# Patient Record
Sex: MALE | Race: BLACK OR AFRICAN AMERICAN | NOT HISPANIC OR LATINO | Employment: UNEMPLOYED | URBAN - METROPOLITAN AREA
[De-identification: names, ages, dates, MRNs, and addresses within clinical notes are randomized per-mention and may not be internally consistent; named-entity substitution may affect disease eponyms.]

---

## 2019-09-02 ENCOUNTER — APPOINTMENT (OUTPATIENT)
Dept: RADIOLOGY | Facility: MEDICAL CENTER | Age: 49
End: 2019-09-02
Attending: EMERGENCY MEDICINE
Payer: MEDICARE

## 2019-09-02 ENCOUNTER — HOSPITAL ENCOUNTER (EMERGENCY)
Facility: MEDICAL CENTER | Age: 49
End: 2019-09-03
Attending: EMERGENCY MEDICINE
Payer: MEDICARE

## 2019-09-02 DIAGNOSIS — I71.019 ACUTE THORACIC AORTIC DISSECTION (HCC): ICD-10-CM

## 2019-09-02 DIAGNOSIS — R10.31 RIGHT GROIN PAIN: ICD-10-CM

## 2019-09-02 DIAGNOSIS — I10 UNCONTROLLED HYPERTENSION: ICD-10-CM

## 2019-09-02 LAB
ALBUMIN SERPL BCP-MCNC: 4.6 G/DL (ref 3.2–4.9)
ALBUMIN/GLOB SERPL: 1.4 G/DL
ALP SERPL-CCNC: 88 U/L (ref 30–99)
ALT SERPL-CCNC: 20 U/L (ref 2–50)
ANION GAP SERPL CALC-SCNC: 13 MMOL/L (ref 0–11.9)
APTT PPP: 27.3 SEC (ref 24.7–36)
AST SERPL-CCNC: 27 U/L (ref 12–45)
BASOPHILS # BLD AUTO: 0.6 % (ref 0–1.8)
BASOPHILS # BLD: 0.04 K/UL (ref 0–0.12)
BILIRUB SERPL-MCNC: 0.5 MG/DL (ref 0.1–1.5)
BUN SERPL-MCNC: 17 MG/DL (ref 8–22)
CALCIUM SERPL-MCNC: 9.7 MG/DL (ref 8.5–10.5)
CHLORIDE SERPL-SCNC: 106 MMOL/L (ref 96–112)
CO2 SERPL-SCNC: 21 MMOL/L (ref 20–33)
CREAT SERPL-MCNC: 1.05 MG/DL (ref 0.5–1.4)
EKG IMPRESSION: NORMAL
EOSINOPHIL # BLD AUTO: 0.05 K/UL (ref 0–0.51)
EOSINOPHIL NFR BLD: 0.7 % (ref 0–6.9)
ERYTHROCYTE [DISTWIDTH] IN BLOOD BY AUTOMATED COUNT: 41.1 FL (ref 35.9–50)
GLOBULIN SER CALC-MCNC: 3.4 G/DL (ref 1.9–3.5)
GLUCOSE SERPL-MCNC: 103 MG/DL (ref 65–99)
HCT VFR BLD AUTO: 43.9 % (ref 42–52)
HGB BLD-MCNC: 14.9 G/DL (ref 14–18)
IMM GRANULOCYTES # BLD AUTO: 0.02 K/UL (ref 0–0.11)
IMM GRANULOCYTES NFR BLD AUTO: 0.3 % (ref 0–0.9)
INR PPP: 1.01 (ref 0.87–1.13)
LYMPHOCYTES # BLD AUTO: 1.86 K/UL (ref 1–4.8)
LYMPHOCYTES NFR BLD: 27.8 % (ref 22–41)
MCH RBC QN AUTO: 28.9 PG (ref 27–33)
MCHC RBC AUTO-ENTMCNC: 33.9 G/DL (ref 33.7–35.3)
MCV RBC AUTO: 85.1 FL (ref 81.4–97.8)
MONOCYTES # BLD AUTO: 0.5 K/UL (ref 0–0.85)
MONOCYTES NFR BLD AUTO: 7.5 % (ref 0–13.4)
NEUTROPHILS # BLD AUTO: 4.21 K/UL (ref 1.82–7.42)
NEUTROPHILS NFR BLD: 63.1 % (ref 44–72)
NRBC # BLD AUTO: 0 K/UL
NRBC BLD-RTO: 0 /100 WBC
NT-PROBNP SERPL IA-MCNC: 882 PG/ML (ref 0–125)
PLATELET # BLD AUTO: 237 K/UL (ref 164–446)
PMV BLD AUTO: 10.8 FL (ref 9–12.9)
POTASSIUM SERPL-SCNC: 3.4 MMOL/L (ref 3.6–5.5)
PROT SERPL-MCNC: 8 G/DL (ref 6–8.2)
PROTHROMBIN TIME: 13.5 SEC (ref 12–14.6)
RBC # BLD AUTO: 5.16 M/UL (ref 4.7–6.1)
SODIUM SERPL-SCNC: 140 MMOL/L (ref 135–145)
TROPONIN T SERPL-MCNC: 17 NG/L (ref 6–19)
WBC # BLD AUTO: 6.7 K/UL (ref 4.8–10.8)

## 2019-09-02 PROCEDURE — 83880 ASSAY OF NATRIURETIC PEPTIDE: CPT

## 2019-09-02 PROCEDURE — 700101 HCHG RX REV CODE 250: Performed by: EMERGENCY MEDICINE

## 2019-09-02 PROCEDURE — 84484 ASSAY OF TROPONIN QUANT: CPT

## 2019-09-02 PROCEDURE — 36620 INSERTION CATHETER ARTERY: CPT

## 2019-09-02 PROCEDURE — 85610 PROTHROMBIN TIME: CPT

## 2019-09-02 PROCEDURE — 700101 HCHG RX REV CODE 250

## 2019-09-02 PROCEDURE — 80053 COMPREHEN METABOLIC PANEL: CPT

## 2019-09-02 PROCEDURE — 71045 X-RAY EXAM CHEST 1 VIEW: CPT

## 2019-09-02 PROCEDURE — 85025 COMPLETE CBC W/AUTO DIFF WBC: CPT

## 2019-09-02 PROCEDURE — 85730 THROMBOPLASTIN TIME PARTIAL: CPT

## 2019-09-02 PROCEDURE — 74175 CTA ABDOMEN W/CONTRAST: CPT

## 2019-09-02 PROCEDURE — 93005 ELECTROCARDIOGRAM TRACING: CPT | Performed by: EMERGENCY MEDICINE

## 2019-09-02 PROCEDURE — 96375 TX/PRO/DX INJ NEW DRUG ADDON: CPT

## 2019-09-02 PROCEDURE — 36620 INSERTION CATHETER ARTERY: CPT | Mod: LT | Performed by: INTERNAL MEDICINE

## 2019-09-02 PROCEDURE — 700105 HCHG RX REV CODE 258: Performed by: EMERGENCY MEDICINE

## 2019-09-02 PROCEDURE — 700111 HCHG RX REV CODE 636 W/ 250 OVERRIDE (IP): Performed by: EMERGENCY MEDICINE

## 2019-09-02 PROCEDURE — 99285 EMERGENCY DEPT VISIT HI MDM: CPT

## 2019-09-02 PROCEDURE — 96374 THER/PROPH/DIAG INJ IV PUSH: CPT

## 2019-09-02 PROCEDURE — C9248 INJ, CLEVIDIPINE BUTYRATE: HCPCS | Performed by: EMERGENCY MEDICINE

## 2019-09-02 PROCEDURE — 36415 COLL VENOUS BLD VENIPUNCTURE: CPT

## 2019-09-02 PROCEDURE — 700117 HCHG RX CONTRAST REV CODE 255: Performed by: EMERGENCY MEDICINE

## 2019-09-02 RX ORDER — LORAZEPAM 2 MG/ML
2 INJECTION INTRAMUSCULAR ONCE
Status: COMPLETED | OUTPATIENT
Start: 2019-09-02 | End: 2019-09-02

## 2019-09-02 RX ORDER — ESMOLOL HYDROCHLORIDE 20 MG/ML
0-200 INJECTION, SOLUTION INTRAVENOUS CONTINUOUS
Status: DISCONTINUED | OUTPATIENT
Start: 2019-09-02 | End: 2019-09-03 | Stop reason: HOSPADM

## 2019-09-02 RX ORDER — METOPROLOL TARTRATE 1 MG/ML
INJECTION, SOLUTION INTRAVENOUS
Status: COMPLETED
Start: 2019-09-02 | End: 2019-09-02

## 2019-09-02 RX ADMIN — ESMOLOL HYDROCHLORIDE 50 MCG/KG/MIN: 20 INJECTION INTRAVENOUS at 21:39

## 2019-09-02 RX ADMIN — LORAZEPAM 2 MG: 2 INJECTION INTRAMUSCULAR; INTRAVENOUS at 21:55

## 2019-09-02 RX ADMIN — CLEVIPIDINE 2 MG/HR: 0.5 EMULSION INTRAVENOUS at 23:52

## 2019-09-02 RX ADMIN — SODIUM CHLORIDE 10 MG/HR: 9 INJECTION, SOLUTION INTRAVENOUS at 22:12

## 2019-09-02 RX ADMIN — IOHEXOL 100 ML: 350 INJECTION, SOLUTION INTRAVENOUS at 21:22

## 2019-09-02 RX ADMIN — ESMOLOL HYDROCHLORIDE 200 MCG/KG/MIN: 20 INJECTION INTRAVENOUS at 23:09

## 2019-09-02 RX ADMIN — METOPROLOL TARTRATE 5 MG: 5 INJECTION, SOLUTION INTRAVENOUS at 21:15

## 2019-09-02 ASSESSMENT — ENCOUNTER SYMPTOMS
CARDIOVASCULAR NEGATIVE: 1
PSYCHIATRIC NEGATIVE: 1
CONSTITUTIONAL NEGATIVE: 1
ABDOMINAL PAIN: 1
RESPIRATORY NEGATIVE: 1
BACK PAIN: 1
EYES NEGATIVE: 1
NEUROLOGICAL NEGATIVE: 1

## 2019-09-03 VITALS
SYSTOLIC BLOOD PRESSURE: 108 MMHG | DIASTOLIC BLOOD PRESSURE: 56 MMHG | HEART RATE: 64 BPM | HEIGHT: 75 IN | OXYGEN SATURATION: 92 % | TEMPERATURE: 98.2 F | BODY MASS INDEX: 28.6 KG/M2 | WEIGHT: 230 LBS | RESPIRATION RATE: 18 BRPM

## 2019-09-03 PROCEDURE — 96375 TX/PRO/DX INJ NEW DRUG ADDON: CPT

## 2019-09-03 PROCEDURE — 700111 HCHG RX REV CODE 636 W/ 250 OVERRIDE (IP): Performed by: EMERGENCY MEDICINE

## 2019-09-03 PROCEDURE — 700101 HCHG RX REV CODE 250

## 2019-09-03 RX ORDER — ESMOLOL HYDROCHLORIDE 20 MG/ML
INJECTION, SOLUTION INTRAVENOUS
Status: DISCONTINUED
Start: 2019-09-03 | End: 2019-09-03 | Stop reason: HOSPADM

## 2019-09-03 RX ORDER — MORPHINE SULFATE 4 MG/ML
4 INJECTION, SOLUTION INTRAMUSCULAR; INTRAVENOUS ONCE
Status: COMPLETED | OUTPATIENT
Start: 2019-09-03 | End: 2019-09-03

## 2019-09-03 RX ORDER — ONDANSETRON 2 MG/ML
4 INJECTION INTRAMUSCULAR; INTRAVENOUS ONCE
Status: COMPLETED | OUTPATIENT
Start: 2019-09-03 | End: 2019-09-03

## 2019-09-03 RX ADMIN — MORPHINE SULFATE 4 MG: 4 INJECTION INTRAVENOUS at 00:15

## 2019-09-03 RX ADMIN — ONDANSETRON 4 MG: 2 INJECTION INTRAMUSCULAR; INTRAVENOUS at 00:15

## 2019-09-03 NOTE — ED TRIAGE NOTES
C/o lower back pain radiating to right groin, sharp in nature.  10/10 hx of abdominal anyerisum.  Hypertensive. Denies chest  Pain sob, n/v .

## 2019-09-03 NOTE — DISCHARGE PLANNING
Medical Social Work    Pt's girlfriend and daughter brought to bedside.  Pt's family was updated by nursing staff.  Emotional support provided to pt and family.

## 2019-09-03 NOTE — ED PROVIDER NOTES
ED Provider Note     Scribed for Tania Suarez D.O. by Gwendolyn Patel. 9/2/2019, 9:02 PM.     Primary care provider: None  Means of arrival: Walk-in         History obtained from: Patient  History limited by: None    CHIEF COMPLAINT  Chief Complaint   Patient presents with   • Abdominal Pain   • Back Pain   • Groin Pain       [facility]  Randall Pressley is a 49 y.o.Black Male with a history of Thoracic aortic aneurysm for 10 years who presents to the emergency Department with complaints of right sided groin pain onset severely earlier today.  He did note some right groin pain on Friday but it seemed to subside.  One week ago patient reports that he felt his back strain as he was putting his child into his vehicle. He states his back pain resolved. Later today while walking, patient suddenly developed acute groin pain and weakness which prompted him to come to ED. Patient reports history of Thoracic aneurysm which was last measured to be ~5 cm in diameter. He reports history of two surgeries to repair tears superior and inferior to the aneurysm, but did not repair the aneurysm itself. Patient states he takes Metoprolol 100 mg, Clonodine, Hydralazine, and Simva statin. However, patient reports he did not take his dose of Metoprolol this morning or tonight.  Patient currently denies chest pain, shortness of breath, nausea, vomiting, abdominal or back pain.  His primary complaint is severe right groin pain.    REVIEW OF SYSTEMS  Pertinent positives include groin pain, back pain (resolved). Pertinent negatives include no fever, chest pain, shortness of breath, abdominal pain or distal paresthesias.  See HPI for further details. All other systems are negative.    PAST MEDICAL HISTORY  Aortic Aneurysm  Hypertension  High cholesterol    FAMILY HISTORY  None pertinent.    SOCIAL HISTORY   None Pertinent.    SURGICAL HISTORY  Two surgical interventions involving aortic aneurysm    CURRENT MEDICATIONS    Current  "Facility-Administered Medications:   •  METOPROLOL TARTRATE 5 MG/5ML IV SOLN, , , ,   No current outpatient medications on file.  Clonidine, metoprolol, hydralazine, simvastatin    ALLERGIES  No Known Allergies    PHYSICAL EXAM  VITAL SIGNS: BP (!) 173/107   Pulse 81   Temp 36.8 °C (98.2 °F) (Temporal)   Resp 18   Ht 1.905 m (6' 3\")   Wt 104.3 kg (230 lb)   SpO2 100%   BMI 28.75 kg/m²     Constitutional:  Sitting up in mild distress. Patient is well developed, well nourished.  HENT: Normocephalic, atraumatic. Bilateral external auditory canals normal. Nose normal Oropharynx moist.  Eyes: PERRL, EOMI.  Neck: Supple, normal appearing.  Cardiovascular: Normal heart rate and Regular rhythm. No murmur, Good heart tones.  Thorax & Lungs: Clear and equal breath sounds with good excursion. No respiratory distress, no rhonchi, wheezing or rales.   Abdomen: Bowel sounds normal in all four quadrants. Soft,nontender, no rebound , guarding, palpable masses.   Skin: Warm, Dry   Back: No cervical, thoracic, or lumbosacral tenderness.  Genitalia: Extreme right groin tenderness to palpation.  Extremities: Peripheral pulses 4/4 No edema, patient is good distal pedal and posterior tibial pulses, good capillary refill, good sensation with normal range of motion of the toes.  Musculoskeletal: Normal range of motion in all major joints. No tenderness to palpation or major deformities noted.   Neurologic: Alert & oriented x 3, Normal motor function, Normal sensory function.  Psychiatric: Affect normal, Judgment normal, Mood normal.     DIAGNOSTICS/PROCEDURES    Results for orders placed or performed during the hospital encounter of 09/02/19   TROPONIN   Result Value Ref Range    Troponin T 17 6 - 19 ng/L   proBrain Natriuretic Peptide, NT   Result Value Ref Range    NT-proBNP 882 (H) 0 - 125 pg/mL   CBC WITH DIFFERENTIAL   Result Value Ref Range    WBC 6.7 4.8 - 10.8 K/uL    RBC 5.16 4.70 - 6.10 M/uL    Hemoglobin 14.9 14.0 - 18.0 " g/dL    Hematocrit 43.9 42.0 - 52.0 %    MCV 85.1 81.4 - 97.8 fL    MCH 28.9 27.0 - 33.0 pg    MCHC 33.9 33.7 - 35.3 g/dL    RDW 41.1 35.9 - 50.0 fL    Platelet Count 237 164 - 446 K/uL    MPV 10.8 9.0 - 12.9 fL    Neutrophils-Polys 63.10 44.00 - 72.00 %    Lymphocytes 27.80 22.00 - 41.00 %    Monocytes 7.50 0.00 - 13.40 %    Eosinophils 0.70 0.00 - 6.90 %    Basophils 0.60 0.00 - 1.80 %    Immature Granulocytes 0.30 0.00 - 0.90 %    Nucleated RBC 0.00 /100 WBC    Neutrophils (Absolute) 4.21 1.82 - 7.42 K/uL    Lymphs (Absolute) 1.86 1.00 - 4.80 K/uL    Monos (Absolute) 0.50 0.00 - 0.85 K/uL    Eos (Absolute) 0.05 0.00 - 0.51 K/uL    Baso (Absolute) 0.04 0.00 - 0.12 K/uL    Immature Granulocytes (abs) 0.02 0.00 - 0.11 K/uL    NRBC (Absolute) 0.00 K/uL   COMP METABOLIC PANEL   Result Value Ref Range    Sodium 140 135 - 145 mmol/L    Potassium 3.4 (L) 3.6 - 5.5 mmol/L    Chloride 106 96 - 112 mmol/L    Co2 21 20 - 33 mmol/L    Anion Gap 13.0 (H) 0.0 - 11.9    Glucose 103 (H) 65 - 99 mg/dL    Bun 17 8 - 22 mg/dL    Creatinine 1.05 0.50 - 1.40 mg/dL    Calcium 9.7 8.5 - 10.5 mg/dL    AST(SGOT) 27 12 - 45 U/L    ALT(SGPT) 20 2 - 50 U/L    Alkaline Phosphatase 88 30 - 99 U/L    Total Bilirubin 0.5 0.1 - 1.5 mg/dL    Albumin 4.6 3.2 - 4.9 g/dL    Total Protein 8.0 6.0 - 8.2 g/dL    Globulin 3.4 1.9 - 3.5 g/dL    A-G Ratio 1.4 g/dL   APTT (PTT)   Result Value Ref Range    APTT 27.3 24.7 - 36.0 sec   PROTHROMBIN TIME (INR)   Result Value Ref Range    PT 13.5 12.0 - 14.6 sec    INR 1.01 0.87 - 1.13   ESTIMATED GFR   Result Value Ref Range    GFR If African American >60 >60 mL/min/1.73 m 2    GFR If Non African American >60 >60 mL/min/1.73 m 2   EKG   Result Value Ref Range    Report       [facility] Emergency Dept.    Test Date:  2019-09-02  Pt Name:    JULY SHORT                Department: ER  MRN:        [id number]                      Room:       RD 12  Gender:     Male                          Technician: [id number]  :        1970                   Requested By:JERILYN STAFFORD  Order #:    [id number]                    Reading MD:    Measurements  Intervals                                Axis  Rate:       76                           P:          44  MS:         194                          QRS:        241  QRSD:       112                          T:          135  QT:         433  QTc:        487    Interpretive Statements  Sinus rhythm  Probable left atrial enlargement  Left anterior fascicular block  Abnormal R-wave progression, late transition  Consider left ventricular hypertrophy  Abnrm T, consider ischemia, anterolateral lds  ST elevation, consider anterior injury  No previous ECG available for comparison       Labs reviewed by me    EKG Interpretation:  Interpreted by me as shown above        RADIOLOGY/PROCEDURES  DX-CHEST-PORTABLE (1 VIEW)   Final Result      1.  Tortuous ascending thoracic aorta, better characterized on CT.   2.  No focal consolidation or pleural effusions.      CT-CTA COMPLETE THORACOABDOMINAL AORTA   Final Result      1.  [place] B aortic dissection, extending from the descending aorta into bilateral external iliac arteries.   2.  The descending aorta is aneurysmally dilated, measuring up to 6 cm in the thorax.   3.  Most of the major abdominal branches are supplied by the true lumen. Left kidney is partially supplied by the false lumen, and is slightly hypoperfused relative to the right.   4.  Aneurysmal, 5 cm aortic root. Graft repair of the ascending aorta. No ascending thoracic dissection.   5.  Incidental 8 mm nodule in the left upper lobe. Follow-up guidelines for high and low risk patients are outlined below.   6.  Cardiomegaly, with left ventricular enlargement.      Findings were discussed with JERILYN STAFFORD on 2019 9:30 PM.         Low Risk: CT at 6-12 months, then consider CT at 18-24 months      High Risk: CT at 6-12 months, then CT at 18-24 months      Low  Risk - Minimal or absent history of smoking and of other known risk factors.      High Risk - History of smoking or of other known risk factors.      Note: These recommendations do not apply to lung cancer screening, patients with immunosuppression, or patients with known primary cancer.      Fleischner Society 2017 Guidelines for Management of Incidentally Detected Pulmonary Nodules in Adults        Results and radiologist interpretation reviewed by me.     COURSE & MEDICAL DECISION MAKING  Pertinent Labs & Imaging studies reviewed. (See chart for details)    9:02 PM - Patient seen and evaluated at bedside. Ordered for DX-chest, CT-CTA complete thoracoabdominal aorta, PTT, APTT, CMP, CBC with differential, proBrain Natriuretic Peptide, and Troponin to evaluate. Patient will be treated with Metoprolol 5 mg/5 mL for his symptoms. Differential diagnoses include, but are not limited to, aortic dissection. Code Aorta was called.    9:25 PM - Patient was reevaluated at bedside. Discussed lab and radiology results with the patient and informed him that he has an acute descending thoracoabdominal dissection involving bilateral iliac arteries and requires emergent surgical interventions. Patient is understand and agreeable.    9:27 PM -  I discussed the patient's case and the above findings with Dr. Adams (Vascular Surgery) who will see the patient.    9:48 PM - I discussed the patient's case and the above findings with Dr. Adams (Vascular Surgery) who states patient cannot be operated on at this facility due to severity of condition and he must be referred to [facility] for surgical interventions.  Dr. Anaya spoke with the patient's surgeons in [place] at [facility] and they are unable to take care of this problem they are even despite the patient wanting to go back to his regular hospital.  [facility] has accepted him in a bed is available.  The patient has continued to be extremely hypertensive and was given esmolol and  Cardene IV piggyback with the hopes of getting his blood pressure down to 110 systolic with a heart rate less than 60.  Eventually I gave the patient Ativan 1 mg IV push as he appears to be very anxious and this did seem to help.  His parameters are where we want them to be at this time.     - Arterial Line placed by  (Intensivist) as requested by Dr. Adams (Vascular Surgery).    12:13 AM - Patient was reevaluated at bedside with improved vital signs. Updated patient on plan of care. I treated the patient with Morphine 4 mg and Zofran for pain management. [facility] arrived for transport.    CRITICAL CARE  The very real possibilty of a deterioration of this patient's condition required the highest level of my preparedness for sudden, emergent intervention.  I provided critical care services, which included medication orders, frequent reevaluations of the patient's condition and response to treatment, ordering and reviewing test results, and discussing the case with various consultants.  The critical care time associated with the care of the patient was 42 minutes. Review chart for interventions. This time is exclusive of any other billable procedures.       DISPOSITION:  Patient will be transferred to [facility] in critical condition.      FINAL IMPRESSION  1. Acute thoracic aortic dissection (HCC)    2. Right groin pain    3. Uncontrolled hypertension    4.      The critical care time associated with the care of the patient was 42 minutes     Gwendolyn BARRON (Shaunna), am scribing for, and in the presence of, Tania Suarez D.O..    Electronically signed by: Gwendolyn Patel (Shaunna), 9/2/2019    Tania BARRON D.O. personally performed the services described in this documentation, as scribed by Gwendolyn Patel in my presence, and it is both accurate and complete. C    The note accurately reflects work and decisions made by me.  Tania Suarez  9/3/2019  2:14 AM

## 2019-09-03 NOTE — ED NOTES
Report to flight RN. Report called to [place] Josseline ROBERTS. Packet with patient information given to flight RN.

## 2019-09-03 NOTE — PROCEDURES
Arterial Line Insertion  Date/Time: 9/2/2019 11:51 PM  Performed by: Sharath Mcginnis D.O.  Authorized by: Sharath Mcginnis D.O.     Consent:     Consent obtained:  Written    Consent given by:  Patient    Risks discussed:  Bleeding, ischemia, pain, infection and repeat procedure  Universal protocol:     Patient identity confirmed:  Verbally with patient and arm band  Indications:     Indications: hemodynamic monitoring    Pre-procedure details:     Skin preparation:  2% Chlorhexidine    Preparation: Patient was prepped and draped in sterile fashion    Anesthesia (see MAR for exact dosages):     Anesthesia method:  Local infiltration    Local anesthetic:  Lidocaine 1% w/o epi  Procedure details:     Location:  L radial    Pete's test performed: no      Needle gauge:  20 G    Placement technique:  Seldinger and ultrasound guided    Number of attempts:  1  Post-procedure details:     Post-procedure:  Biopatch applied, secured with tape, sterile dressing applied and sutured    CMS:  Normal    Patient tolerance of procedure:  Tolerated well, no immediate complications

## 2019-09-03 NOTE — ED NOTES
Developmentally appropriate activities provided for pt's child upon RN request. No additional child life needs were noted at this time, but will follow to assess and provide services as needed.No additional child life needs were noted at this time, but will follow to assess and provide services as needed.

## 2019-09-03 NOTE — CONSULTS
"        Date of Service  9/2/2019    Reason For Consult  Aortic Dissection    Requesting Physician  Tania Suarez DO    Consulting Physician  Roverto Adams M.D.    Primary Care Physician  No primary care provider on file.    Chief Complaint  Abdominal and back pain    History of Present Illness  Mr. Pressley is a pleasant 49 y.o. AA male with poorly-controlled HTN who presented 9/2/2019 with one week of radiating back pain.     We do not have old records for review, but he has a history of what sounds like an acute type B dissection about 10 years ago s/p open repair with a tube graft in [place], [place]. The dissection occurred while he was lifting weights. He was followed regularly and subsequently developed an ascending aortic aneurysm and underwent open repair about 3 years ago via sternotomy.    He reports regular follow-up with his surgeon and had a CT scan February of this year (records/imaging not available) and was told his aorta measured 5cm and that the plan was ongoing surveillance. He is unsure which portion of the aorta the diameter was referencing.    Other than HTN, the patient is in fairly good health. He has never smoked and has no known history of CAD. He takes hydralazine, lisinopril, metoprolol, and clonidine but pressure was 220/130 on arrival this evening.    He states symptoms started a week ago with a \"mild tearing sensation\" in his back, similar to what happened a decade ago. He had dull pain the majority of the week which he was able to ignore until it progressed today. Today, he developed more severe back pain with radiation across the lower abdomen and down into the right groin.    Review of Systems  Review of Systems   Constitutional: Negative.    HENT: Negative.    Eyes: Negative.    Respiratory: Negative.    Cardiovascular: Negative.    Gastrointestinal: Positive for abdominal pain.   Genitourinary: Negative.    Musculoskeletal: Positive for back pain.   Skin: Negative.    Neurological: " Negative.    Endo/Heme/Allergies: Negative.    Psychiatric/Behavioral: Negative.        Past Medical History  HTN  Type B aortic dissection  Ascending aortic aneurysm    Surgical History  Open repair of Type B dissection  Ascending aortic aneurysm repair    Family History  No family history on file.     Social History  Social History     Socioeconomic History   • Marital status: Single     Spouse name: Not on file   • Number of children: Not on file   • Years of education: Not on file   • Highest education level: Not on file   Occupational History   • Not on file   Social Needs   • Financial resource strain: Not on file   • Food insecurity:     Worry: Not on file     Inability: Not on file   • Transportation needs:     Medical: Not on file     Non-medical: Not on file   Tobacco Use   • Smoking status: Not on file   Substance and Sexual Activity   • Alcohol use: Not on file   • Drug use: Not on file   • Sexual activity: Not on file   Lifestyle   • Physical activity:     Days per week: Not on file     Minutes per session: Not on file   • Stress: Not on file   Relationships   • Social connections:     Talks on phone: Not on file     Gets together: Not on file     Attends Latter-day service: Not on file     Active member of club or organization: Not on file     Attends meetings of clubs or organizations: Not on file     Relationship status: Not on file   • Intimate partner violence:     Fear of current or ex partner: Not on file     Emotionally abused: Not on file     Physically abused: Not on file     Forced sexual activity: Not on file   Other Topics Concern   • Not on file   Social History Narrative   • Not on file       Medications  None       Current Facility-Administered Medications   Medication Dose Route Frequency Provider Last Rate Last Dose   • esmolol (BREVIBLOC) 2000 mg/100mL premix infusion  0-200 mcg/kg/min Intravenous Continuous Tania Suarez D.O. 62.6 mL/hr at 09/02/19 2157 200 mcg/kg/min at 09/02/19  2157   • niCARdipine (CARDENE) 25 mg in  mL Infusion  1-15 mg/hr Intravenous Continuous Tania Suarez D.O. 150 mL/hr at 09/02/19 2246 15 mg/hr at 09/02/19 2246     No current outpatient medications on file.       Allergies  No Known Allergies      Physical Exam  Temp:  [36.8 °C (98.2 °F)] 36.8 °C (98.2 °F)  Pulse:  [71-87] 80  Resp:  [18] 18  BP: (133-223)/() 133/69  SpO2:  [95 %-100 %] 97 %    Pulse/Extremity Exam:    Femorals:        Right: palpable       Left palpable    Pedal Pulses:       Right DP palpable       Right PT palpable       Left DP palpable       Left PT palpable    Wounds: None    General appearance: NAD, conversing appropriately  Psych: Normal affect, mood, judgement  Neuro: CN II-XII grossly intact.   Neck: full range of motion  Lungs: No inspiratory stridor or wheezing  CV: RRR  Abdomen: Soft, NT/ND  Skin: No rashes    Labs Reviewed Today:  Recent Labs     09/02/19 2101   WBC 6.7   RBC 5.16   HEMOGLOBIN 14.9   HEMATOCRIT 43.9   MCV 85.1   MCH 28.9   MCHC 33.9   RDW 41.1   PLATELETCT 237   MPV 10.8     Recent Labs     09/02/19 2101   SODIUM 140   POTASSIUM 3.4*   CHLORIDE 106   CO2 21   GLUCOSE 103*   BUN 17   CREATININE 1.05   CALCIUM 9.7     Recent Labs     09/02/19 2101   ALTSGPT 20   ASTSGOT 27   ALKPHOSPHAT 88   TBILIRUBIN 0.5   GLUCOSE 103*     Recent Labs     09/02/19 2101   APTT 27.3   INR 1.01             No results for input(s): TROPONINI in the last 72 hours.    Urinalysis:    No results found     Imaging Reviewed Today:  I personally reviewed the patient's CT scan this evening. My interpretation is below:    CTA: There is evidence of a prior ascending aortic repair and a tube graft in the proximal descending thoracic aorta. Arch vessels are all widely patent. Just distal to the thoracic repair, there is a tripartate dissection flap and a chronic-appearing dissection through the entire abdominal aorta extending to the bilateral EIAs. Visceral vessels are all off the  true lumen and widely patent. The left renal straddles the flap. There is perfusion of the false lumen throughout and the entirety of the native aorta is aneurysmal. The thoracic aorta measures up to 6.4cm, 5.2cm at the celiac, and the L MIRELLA measures 2.8cm. There is no laura-aortic stranding.    Assessment/Plan & Medical Decision-Making  Mr. Pressley presents with a chronic dissecting thoracoabdominal aneurysm measuring up to 6.4cm in the descending thoracic aorta in the setting of a prior open thoracic and ascending aortic repair. He is now symptomatic with poorly-controlled HTN.    He has been started on esmolol and nicardipine and both his pressure and symptoms have improved significantly, but he is maxed on both and BP and HR are not yet at goal.     A-line will be placed and additional meds will be titrated.    I spent over 120 minutes at bedside and in the ED discussing the diagnosis with the patient and ED physicians. He will need either a re-do open TAA repair or a 4 vessel fenestrated repair either of which is best handled at [facility]. I do not have access to case planning software/centerline measurements nor do I have access to the endovascular devices necessary to fix this as they are not commercially available on the [place] market. Open TAA repairs have been shown to have better outcomes at high-volume centers.    I have spoken with Dr. Kerri Wong at [facility] who has accepted the patient as a transfer.    Roverto Adams MD  Vascular Surgeon  [facility]  Office: [phone]

## 2019-09-03 NOTE — DISCHARGE PLANNING
Received call that Dr. Suarez is requesting to transfer pt to [facility].  Called [facility], spoke with Bala.  Pt accepted by Dr. Kerri Wong.  Pt going to E29 Bed 11A.  Spoke with Harshil at [facility], they have accepted the transfer, pt will be going by fixed wing.  ETA for [facility] arrival to bedside is 23:50.  Encounter summary, face sheets, Cobra form x2, and films to disc in packet to go with pt.

## 2019-09-03 NOTE — DISCHARGE PLANNING
Medical Social Work    Referral: Code Aorta    Intervention: MSW received a call from Triage RN regarding code aorta.  RN is placing pt's girlfriend and young daughter in family room.  MSW met with pt's girlfriend, Brenda Estrada ([phone]) and pt's daughter in the family room.  MSW provided them with a brief update on plan of care at this time.  Pt's girlfriend was provided with a few snacks for the little one and pt was updated.      Plan: SW will remain available as needed for family support.

## 2020-02-15 ENCOUNTER — HOSPITAL ENCOUNTER (EMERGENCY)
Facility: MEDICAL CENTER | Age: 50
End: 2020-02-15
Attending: EMERGENCY MEDICINE
Payer: MEDICARE

## 2020-02-15 ENCOUNTER — APPOINTMENT (OUTPATIENT)
Dept: RADIOLOGY | Facility: MEDICAL CENTER | Age: 50
End: 2020-02-15
Attending: EMERGENCY MEDICINE
Payer: MEDICARE

## 2020-02-15 VITALS
TEMPERATURE: 97.6 F | HEART RATE: 58 BPM | HEIGHT: 75 IN | DIASTOLIC BLOOD PRESSURE: 50 MMHG | OXYGEN SATURATION: 96 % | BODY MASS INDEX: 28.6 KG/M2 | WEIGHT: 230 LBS | SYSTOLIC BLOOD PRESSURE: 97 MMHG | RESPIRATION RATE: 14 BRPM

## 2020-02-15 DIAGNOSIS — I16.1 HYPERTENSIVE EMERGENCY: ICD-10-CM

## 2020-02-15 DIAGNOSIS — I71.03 DISSECTION OF THORACOABDOMINAL AORTA (HCC): ICD-10-CM

## 2020-02-15 DIAGNOSIS — I71.10 RUPTURED ANEURYSM OF THORACIC AORTA (HCC): ICD-10-CM

## 2020-02-15 LAB
ALBUMIN SERPL BCP-MCNC: 3.9 G/DL (ref 3.2–4.9)
ALBUMIN/GLOB SERPL: 1.3 G/DL
ALP SERPL-CCNC: 93 U/L (ref 30–99)
ALT SERPL-CCNC: 15 U/L (ref 2–50)
ANION GAP SERPL CALC-SCNC: 11 MMOL/L (ref 0–11.9)
APTT PPP: 22.1 SEC (ref 24.7–36)
AST SERPL-CCNC: 20 U/L (ref 12–45)
BASOPHILS # BLD AUTO: 0.2 % (ref 0–1.8)
BASOPHILS # BLD: 0.02 K/UL (ref 0–0.12)
BILIRUB SERPL-MCNC: 1.5 MG/DL (ref 0.1–1.5)
BUN SERPL-MCNC: 14 MG/DL (ref 8–22)
CALCIUM SERPL-MCNC: 8.8 MG/DL (ref 8.5–10.5)
CHLORIDE SERPL-SCNC: 106 MMOL/L (ref 96–112)
CO2 SERPL-SCNC: 23 MMOL/L (ref 20–33)
CREAT SERPL-MCNC: 1.35 MG/DL (ref 0.5–1.4)
EKG IMPRESSION: NORMAL
EOSINOPHIL # BLD AUTO: 0.11 K/UL (ref 0–0.51)
EOSINOPHIL NFR BLD: 1.3 % (ref 0–6.9)
ERYTHROCYTE [DISTWIDTH] IN BLOOD BY AUTOMATED COUNT: 41.4 FL (ref 35.9–50)
GLOBULIN SER CALC-MCNC: 3 G/DL (ref 1.9–3.5)
GLUCOSE SERPL-MCNC: 173 MG/DL (ref 65–99)
HCT VFR BLD AUTO: 38.4 % (ref 42–52)
HGB BLD-MCNC: 13.7 G/DL (ref 14–18)
IMM GRANULOCYTES # BLD AUTO: 0.04 K/UL (ref 0–0.11)
IMM GRANULOCYTES NFR BLD AUTO: 0.5 % (ref 0–0.9)
INR PPP: 1.18 (ref 0.87–1.13)
LYMPHOCYTES # BLD AUTO: 2.47 K/UL (ref 1–4.8)
LYMPHOCYTES NFR BLD: 28.2 % (ref 22–41)
MAGNESIUM SERPL-MCNC: 1.9 MG/DL (ref 1.5–2.5)
MCH RBC QN AUTO: 30.4 PG (ref 27–33)
MCHC RBC AUTO-ENTMCNC: 35.7 G/DL (ref 33.7–35.3)
MCV RBC AUTO: 85.3 FL (ref 81.4–97.8)
MONOCYTES # BLD AUTO: 0.81 K/UL (ref 0–0.85)
MONOCYTES NFR BLD AUTO: 9.2 % (ref 0–13.4)
NEUTROPHILS # BLD AUTO: 5.32 K/UL (ref 1.82–7.42)
NEUTROPHILS NFR BLD: 60.6 % (ref 44–72)
NRBC # BLD AUTO: 0 K/UL
NRBC BLD-RTO: 0 /100 WBC
NT-PROBNP SERPL IA-MCNC: 359 PG/ML (ref 0–125)
PLATELET # BLD AUTO: 167 K/UL (ref 164–446)
PMV BLD AUTO: 10.6 FL (ref 9–12.9)
POTASSIUM SERPL-SCNC: 2.5 MMOL/L (ref 3.6–5.5)
PROT SERPL-MCNC: 6.9 G/DL (ref 6–8.2)
PROTHROMBIN TIME: 15.2 SEC (ref 12–14.6)
RBC # BLD AUTO: 4.5 M/UL (ref 4.7–6.1)
SODIUM SERPL-SCNC: 140 MMOL/L (ref 135–145)
TROPONIN T SERPL-MCNC: 24 NG/L (ref 6–19)
WBC # BLD AUTO: 8.8 K/UL (ref 4.8–10.8)

## 2020-02-15 PROCEDURE — 85610 PROTHROMBIN TIME: CPT

## 2020-02-15 PROCEDURE — 99285 EMERGENCY DEPT VISIT HI MDM: CPT

## 2020-02-15 PROCEDURE — 96367 TX/PROPH/DG ADDL SEQ IV INF: CPT

## 2020-02-15 PROCEDURE — 700117 HCHG RX CONTRAST REV CODE 255

## 2020-02-15 PROCEDURE — 85730 THROMBOPLASTIN TIME PARTIAL: CPT

## 2020-02-15 PROCEDURE — 71045 X-RAY EXAM CHEST 1 VIEW: CPT

## 2020-02-15 PROCEDURE — 83880 ASSAY OF NATRIURETIC PEPTIDE: CPT

## 2020-02-15 PROCEDURE — 96365 THER/PROPH/DIAG IV INF INIT: CPT | Mod: XU

## 2020-02-15 PROCEDURE — 93005 ELECTROCARDIOGRAM TRACING: CPT | Performed by: EMERGENCY MEDICINE

## 2020-02-15 PROCEDURE — 80053 COMPREHEN METABOLIC PANEL: CPT

## 2020-02-15 PROCEDURE — 96375 TX/PRO/DX INJ NEW DRUG ADDON: CPT | Mod: XU

## 2020-02-15 PROCEDURE — 700101 HCHG RX REV CODE 250

## 2020-02-15 PROCEDURE — 700101 HCHG RX REV CODE 250: Performed by: EMERGENCY MEDICINE

## 2020-02-15 PROCEDURE — 96368 THER/DIAG CONCURRENT INF: CPT

## 2020-02-15 PROCEDURE — 96366 THER/PROPH/DIAG IV INF ADDON: CPT

## 2020-02-15 PROCEDURE — 36415 COLL VENOUS BLD VENIPUNCTURE: CPT

## 2020-02-15 PROCEDURE — 700101 HCHG RX REV CODE 250: Performed by: SURGERY

## 2020-02-15 PROCEDURE — 96376 TX/PRO/DX INJ SAME DRUG ADON: CPT | Mod: XU

## 2020-02-15 PROCEDURE — 700105 HCHG RX REV CODE 258: Performed by: SURGERY

## 2020-02-15 PROCEDURE — 84484 ASSAY OF TROPONIN QUANT: CPT

## 2020-02-15 PROCEDURE — 74175 CTA ABDOMEN W/CONTRAST: CPT

## 2020-02-15 PROCEDURE — 700111 HCHG RX REV CODE 636 W/ 250 OVERRIDE (IP): Performed by: EMERGENCY MEDICINE

## 2020-02-15 PROCEDURE — 83735 ASSAY OF MAGNESIUM: CPT

## 2020-02-15 PROCEDURE — 85025 COMPLETE CBC W/AUTO DIFF WBC: CPT

## 2020-02-15 RX ORDER — ONDANSETRON 2 MG/ML
4 INJECTION INTRAMUSCULAR; INTRAVENOUS ONCE
Status: COMPLETED | OUTPATIENT
Start: 2020-02-15 | End: 2020-02-15

## 2020-02-15 RX ORDER — DEXTROSE MONOHYDRATE, SODIUM CHLORIDE, SODIUM LACTATE, POTASSIUM CHLORIDE, CALCIUM CHLORIDE 5; 600; 310; 179; 20 G/100ML; MG/100ML; MG/100ML; MG/100ML; MG/100ML
INJECTION, SOLUTION INTRAVENOUS CONTINUOUS
Status: CANCELLED | OUTPATIENT
Start: 2020-02-15

## 2020-02-15 RX ORDER — POTASSIUM CHLORIDE 7.45 MG/ML
10 INJECTION INTRAVENOUS
Status: COMPLETED | OUTPATIENT
Start: 2020-02-15 | End: 2020-02-15

## 2020-02-15 RX ORDER — HYDROMORPHONE HYDROCHLORIDE 1 MG/ML
0.5 INJECTION, SOLUTION INTRAMUSCULAR; INTRAVENOUS; SUBCUTANEOUS ONCE
Status: COMPLETED | OUTPATIENT
Start: 2020-02-15 | End: 2020-02-15

## 2020-02-15 RX ORDER — ESMOLOL HYDROCHLORIDE 20 MG/ML
INJECTION, SOLUTION INTRAVENOUS
Status: COMPLETED
Start: 2020-02-15 | End: 2020-02-15

## 2020-02-15 RX ORDER — ESMOLOL HYDROCHLORIDE 20 MG/ML
0-200 INJECTION, SOLUTION INTRAVENOUS CONTINUOUS
Status: DISCONTINUED | OUTPATIENT
Start: 2020-02-15 | End: 2020-02-15 | Stop reason: HOSPADM

## 2020-02-15 RX ORDER — LABETALOL HYDROCHLORIDE 5 MG/ML
10 INJECTION, SOLUTION INTRAVENOUS ONCE
Status: COMPLETED | OUTPATIENT
Start: 2020-02-15 | End: 2020-02-15

## 2020-02-15 RX ORDER — LORAZEPAM 2 MG/ML
1 INJECTION INTRAMUSCULAR ONCE
Status: COMPLETED | OUTPATIENT
Start: 2020-02-15 | End: 2020-02-15

## 2020-02-15 RX ORDER — MORPHINE SULFATE 4 MG/ML
4 INJECTION, SOLUTION INTRAMUSCULAR; INTRAVENOUS ONCE
Status: COMPLETED | OUTPATIENT
Start: 2020-02-15 | End: 2020-02-15

## 2020-02-15 RX ORDER — LABETALOL HYDROCHLORIDE 5 MG/ML
INJECTION, SOLUTION INTRAVENOUS
Status: COMPLETED
Start: 2020-02-15 | End: 2020-02-15

## 2020-02-15 RX ADMIN — POTASSIUM CHLORIDE 10 MEQ: 10 INJECTION, SOLUTION INTRAVENOUS at 15:41

## 2020-02-15 RX ADMIN — ONDANSETRON 4 MG: 2 INJECTION INTRAMUSCULAR; INTRAVENOUS at 13:45

## 2020-02-15 RX ADMIN — POTASSIUM CHLORIDE 10 MEQ: 10 INJECTION, SOLUTION INTRAVENOUS at 15:40

## 2020-02-15 RX ADMIN — ESMOLOL HYDROCHLORIDE 50 MCG/KG/MIN: 20 INJECTION INTRAVENOUS at 12:23

## 2020-02-15 RX ADMIN — ESMOLOL HYDROCHLORIDE 200 MCG/KG/MIN: 20 INJECTION INTRAVENOUS at 13:53

## 2020-02-15 RX ADMIN — POTASSIUM CHLORIDE 10 MEQ: 10 INJECTION, SOLUTION INTRAVENOUS at 14:41

## 2020-02-15 RX ADMIN — SODIUM CHLORIDE 15 MG/HR: 9 INJECTION, SOLUTION INTRAVENOUS at 14:34

## 2020-02-15 RX ADMIN — LABETALOL HYDROCHLORIDE 10 MG: 5 INJECTION, SOLUTION INTRAVENOUS at 13:30

## 2020-02-15 RX ADMIN — MORPHINE SULFATE 4 MG: 4 INJECTION INTRAVENOUS at 13:39

## 2020-02-15 RX ADMIN — ONDANSETRON 4 MG: 2 INJECTION INTRAMUSCULAR; INTRAVENOUS at 16:18

## 2020-02-15 RX ADMIN — IOHEXOL 100 ML: 350 INJECTION, SOLUTION INTRAVENOUS at 12:17

## 2020-02-15 RX ADMIN — SODIUM CHLORIDE 10 MG/HR: 9 INJECTION, SOLUTION INTRAVENOUS at 12:46

## 2020-02-15 RX ADMIN — ESMOLOL HYDROCHLORIDE 200 MCG/KG/MIN: 20 INJECTION INTRAVENOUS at 16:26

## 2020-02-15 RX ADMIN — HYDROMORPHONE HYDROCHLORIDE 0.5 MG: 1 INJECTION, SOLUTION INTRAMUSCULAR; INTRAVENOUS; SUBCUTANEOUS at 12:18

## 2020-02-15 RX ADMIN — ESMOLOL HYDROCHLORIDE 200 MCG/KG/MIN: 20 INJECTION INTRAVENOUS at 15:49

## 2020-02-15 RX ADMIN — POTASSIUM CHLORIDE 10 MEQ: 10 INJECTION, SOLUTION INTRAVENOUS at 13:30

## 2020-02-15 RX ADMIN — LORAZEPAM 1 MG: 2 INJECTION INTRAMUSCULAR; INTRAVENOUS at 13:40

## 2020-02-15 RX ADMIN — LABETALOL HYDROCHLORIDE: 5 INJECTION, SOLUTION INTRAVENOUS at 11:59

## 2020-02-15 RX ADMIN — MORPHINE SULFATE 4 MG: 4 INJECTION INTRAVENOUS at 16:23

## 2020-02-15 ASSESSMENT — ENCOUNTER SYMPTOMS
MUSCULOSKELETAL NEGATIVE: 1
EYES NEGATIVE: 1
WEAKNESS: 1
NAUSEA: 1
PSYCHIATRIC NEGATIVE: 1
RESPIRATORY NEGATIVE: 1
CONSTITUTIONAL NEGATIVE: 1

## 2020-02-15 NOTE — ED TRIAGE NOTES
Chief Complaint   Patient presents with   • Abdominal Pain   • Numbness     Pt BIB EMS. Pt has hx of AAA with correction, 10 years ago per pt. Pt c/o BLE numbness and abd pain x yestderday with increasing severity when pt went for a walk. C/o mid abd and LUQ pain rad to L side. Code Aorta called and ERP at bedside. Hx HTN.

## 2020-02-15 NOTE — ED NOTES
Stuffed animals and stickers provided for pt's children. Child Life available as need at ext [phone].

## 2020-02-15 NOTE — DISCHARGE PLANNING
Call back to Emmanuel at [facility] to update that fixed wing is on their way.    Spoke with Christel who released bed number [id number].    Call transferred to Rd for report.

## 2020-02-15 NOTE — DISCHARGE PLANNING
Call to assist with emergent transport to [place]. Dr Rollins has call into pt's MD (Dr Luis Arellano [phone]) to discuss case and acceptance of pt.

## 2020-02-15 NOTE — ED NOTES
Pt remains resting, BP continues to be within desired limits. Family brought to family consultation room to rest.

## 2020-02-15 NOTE — ED PROVIDER NOTES
"ED Provider Note    ED Provider Note    Primary care provider: No primary care provider on file.  Means of arrival: EMS  History obtained from: Patient    CHIEF COMPLAINT  Chief Complaint   Patient presents with   • Abdominal Pain   • Numbness     Seen at 11:51 AM.   HPI  Randall Pressley is a 49 y.o. male who presents to the Emergency Department fairly abrupt onset of substernal left-sided stabbing and tearing chest pain beginning yesterday, now radiating down the left pectoral region.  The patient states it feels similar to when he had an aortic dissection in the past.  Yesterday he noted some tingling the lower extremities associated with the pain, tingling has improved.  He denies any lightheadedness, shortness of breath, nausea, vomiting, back pain, numbness or focal weakness.  He is compliant with his antihypertensives, he took a dose of clonidine at around 4 AM this morning.  He was last seen here for dissection in September, he was transferred from here to [facility].  The patient then discharged himself prior to surgical repair as he had a child that was due to be born.  The patient did follow-up with a different surgeon, Dr. Arellano in [place] who he would like to be contacted to take care of the dissection.    REVIEW OF SYSTEMS  See HPI,   Remainder of ROS negative.     PAST MEDICAL HISTORY   Aortic dissection, aortic aneurysm    SURGICAL HISTORY  patient denies any surgical history    SOCIAL HISTORY  Social History     Tobacco Use   • Smoking status: Not on file   Substance Use Topics   • Alcohol use: Not on file   • Drug use: Not on file      Social History     Substance and Sexual Activity   Drug Use Not on file       FAMILY HISTORY  No family history on file.    CURRENT MEDICATIONS  Reviewed.  See Encounter Summary.     ALLERGIES  No Known Allergies    PHYSICAL EXAM  VITAL SIGNS: /71   Pulse 73   Resp 14   Ht 1.905 m (6' 3\")   Wt 104.3 kg (230 lb)   SpO2 98%   BMI 28.75 kg/m² "   Constitutional: Awake, alert in no apparent distress.  Appears appropriately anxious and uncomfortable.  HENT: Normocephalic, Bilateral external ears normal. Nose normal.   Eyes: Conjunctiva normal, non-icteric, EOMI.    Thorax & Lungs: Easy unlabored respirations, Clear to ascultation bilaterally.  Sternotomy scar.  Cardiovascular: Tachycardic, No murmurs, rubs or gallops. Bounding DP bilat.   Abdomen:  Soft, nontender, nondistended, normal active bowel sounds.   :    Skin: Visualized skin is  Dry, No erythema, No rash.   Musculoskeletal:   No cyanosis, clubbing or edema.  Neurologic: Alert, Grossly non-focal.   Psychiatric: Normal affect, Normal mood  Lymphatic:  No cervical LAD    EKG   12 lead Interpreted by me  Rhythm: Normal sinus rhythm   rate: 84  Axis: normal  Ectopy: none  Conduction: LVH  ST Segments: no acute change  T Waves: no acute change  Clinical Impression: Severe LVH, unchanged compared to prior EKG, no acute ischemic changes.    RADIOLOGY  DX-CHEST-PORTABLE (1 VIEW)   Final Result      Worsening mediastinal widening is compatible with no mediastinal hematoma      New right basilar opacities are compatible with known moderate pleural effusion and atelectasis      CT-CTA COMPLETE THORACOABDOMINAL AORTA   Final Result      1.  Worsening [place] type B aortic dissection with proximal extension to the level of the ligamentum arteriosum, enlarging descending thoracic aortic diameter      2.  New mediastinal, right greater than left pleural and upper abdominal gastrohepatic ligament/celiac axis vascular pedicle hemorrhage indicating extravasation      3.  New mass effect upon the left atrium and left ventricle secondary to mediastinal hemorrhage. Left ventricular hypertrophy unchanged      4.  Enlarging descending thoracic aortic diameter now measuring 6.7 from 5.3 cm      5.  Stable abdominal aortic aneurysm below the level of the renal arteries with unchanged dissection into bilateral common  iliac arteries.      6.  Ambiguous lumen character with the true lumen likely supplying the celiac, SMA, right renal and GABRIELA.  Left renal artery is supplied by both lumen      Findings were discussed with LE ARAUZ on 2/15/2020 12:22 PM.            COURSE & MEDICAL DECISION MAKING  Pertinent Labs & Imaging studies reviewed. (See chart for details)    Differential diagnoses include but are not limited to: Aortic rupture, aortic dissection, aortic aneurysm.    11:51 AM - Medical record reviewed, patient seen and examined at bedside.    12:01 PM-10 mg of IV labetalol given, patient directly from here to CT.    12:39 PM-case discussed with Dr. Adams, vascular surgery, images reviewed, esmolol drip in progress.  Pain medications given.  The patient has a surgeon in [place] who would like to be contacted.  We have the information for Dr. Luis Arellano.  We will attempt to contact him regarding transfer. Case management notified.    12:53 PM-patient is maxed out on Cardene and esmolol, systolic blood pressure 140, heart rate 65.  Plan to give additional IV push of labetalol.  Still awaiting to hear back from Dr. Arellano.  He is apparently on call today at [facility].    1:16 PM-transfer center from [facility] states that Dr. Arellano is not on-call but they will review the case and contact me back with possible acceptance.    1:21 PM-patient now diaphoretic, still having pain, plan to give 4 mg of IV morphine, 4 mg of Zofran and 1 mg of IV Ativan.    1:29 PM-[facility] asking for faxing of notes from vascular.  Still awaiting acceptance.    1:50 PM-recontacted the transfer center at [facility], they state that he is in queue for evaluation, they stated they are very busy and it may be another 15 minutes before the physician has a chance to review the chart.  At this time due to time constraints of the patient's tenuous situation will contact [facility] for possible transfer.    2:00 PM- D/w Dr. Hall ([facility] pulm/crit care xfer center).   He  will have vascular surgery contact me for case.  Discussed case with Dr. Noonan (vascular surgery at [facility]).  She is scrubbed in and a complicated bypass case and would like to review the images prior to accepting a transfer.  On further back on information from [facility], apparently they referred the patient back to [facility] after the last visit with Dr. Arellano.    2:14 PM- D/w Dr. Arellano (who is not on call)- he recommends [facility].     2:29 PM-case discussed with CT surgery at [facility].  He would like images sent.     2:45 PM-  He will accept the case.    Decision Making:  This is a 49 y.o. year old male who presents with tearing chest pain.  The patient arrives hypertensive and tachycardic.  He has a known history of aortic dissection and aneurysm.  He had an initial repair 10 years ago.  Most recent follow-up in September was not addressed as the patient discharge himself in the hospital prior to repair.  The patient now has aortic rupture with blood in the windows.  This likely occurred yesterday as some of the blood is subacute.  He is hemodynamically stable at this time but extremely tenuous situation.  His blood pressure has been aggressively control with esmolol and Cardene.  Additional labetalol aliquots have been given as well due to maxed out esmolol and Cardene.  Unfortunately we cannot do this repair at this facility.  He will need to transfer for definitive treatment.  The patient was initially quite adamant about transferring to [place] where his home of record is.  He would like to have Dr. Arellano evaluate him.  We attempted to contact Dr. Arellano on several occasions, we did attempt to arrange transfer to [facility] as well, unfortunately due to time constraints and the critical nature of this we can no longer pursue [facility] and will need to find a accepting hospital that can stabilize this critically ill patient.        CRITICAL CARE  The very real possibilty of a deterioration of this patient's  condition required the highest level of my preparedness for sudden, emergent intervention.  I provided critical care services, which included medication orders, frequent reevaluations of the patient's condition and response to treatment, ordering and reviewing test results, and discussing the case with various consultants.  The critical care time associated with the care of the patient was 65 minutes. Review chart for interventions. This time is exclusive of any other billable procedures.         FINAL IMPRESSION  1. Ruptured aneurysm of thoracic aorta (HCC)

## 2020-02-15 NOTE — ED NOTES
Pt sleeping on [facility]. Continue to await bed assignment at [facility] and transport arrangement.

## 2020-02-15 NOTE — DISCHARGE PLANNING
Call to [facility] who will arrange air transport asap. Pt will be going via fixed wing.    Facesheet faxed to [facility].

## 2020-02-15 NOTE — DISCHARGE PLANNING
Call to [facility] ([phone] Emmanuel) who took information and call was connected to Dr Rollins.

## 2020-02-15 NOTE — DISCHARGE PLANNING
Pt accepted by Dr Jacobsen (per Dr Rollins). Call to [facility] to confirm.    Per Darlene they are working on ICU bed right now and will call back shortly.

## 2020-02-15 NOTE — DISCHARGE PLANNING
Emmanuel with [facility] called to accept pt (Dr Petty accepting) requesting notes, labs and facesheet faxed prior to releasing bed ([phone]). Emmanuel stated ok to start setting up transport.

## 2020-02-15 NOTE — DISCHARGE PLANNING
Call to [facility] ([facility]ar[facility] [phone]) who will page on call MD (not Dr Arellano).    Dr Adams's consult and facesheet faxed to [phone] with confirmation.    Call to film room for disc.

## 2020-02-15 NOTE — CONSULTS
Date of Service  2/15/2020    Reason For Consult  Chest/back pain, known TAA and chronic dissection    Requesting Physician  Carter Rollins MD    Consulting Physician  Roverto Adams M.D.    Primary Care Physician  No primary care provider on file.    Chief Complaint  Chest/back pain    History of Present Illness  Mr. Pressley is a 49 y.o. AA male w/ poorly-controlled HTN, non-compliant w/ meds who presented 2/15/2020 with recurrent chest and back pain. He has a known history of chronic type B dissection 10yrs ago s/p open repair w/ a tube graft in [place], [place]. He later developed an ascending aortic aneurysm and underwent a second open repair about 3yrs ago.    He presented to the ED here last September with radiating back pain consistent with a symptomatic aneurysm. At that time, the TAA measured 6.4cm. He was transferred [id number] to [facility] but left there AMA.    He returns today with chest, back, and mild abdominal pain over the past few days. Although he currently denies any pain. His wife also tells me he's had several episodes over the past few days of bilateral leg weakness.    CTA today confirms the thoracic portion of the dissecting aneurysm has grown to approximately 8cm and there is a contained rupture.    He is currently hypertensive, denies extremity weakness and pain.    Review of Systems  Review of Systems   Constitutional: Negative.    HENT: Negative.    Eyes: Negative.    Respiratory: Negative.    Cardiovascular: Positive for chest pain.   Gastrointestinal: Positive for nausea.   Genitourinary: Negative.    Musculoskeletal: Negative.    Skin: Negative.    Neurological: Positive for weakness.   Endo/Heme/Allergies: Negative.    Psychiatric/Behavioral: Negative.        Past Medical History  No past medical history on file.    Surgical History  No past surgical history on file.     Family History  No family history on file.     Social History  Social History     Socioeconomic History    • Marital status: Single     Spouse name: Not on file   • Number of children: Not on file   • Years of education: Not on file   • Highest education level: Not on file   Occupational History   • Not on file   Social Needs   • Financial resource strain: Not on file   • Food insecurity     Worry: Not on file     Inability: Not on file   • Transportation needs     Medical: Not on file     Non-medical: Not on file   Tobacco Use   • Smoking status: Not on file   Substance and Sexual Activity   • Alcohol use: Not on file   • Drug use: Not on file   • Sexual activity: Not on file   Lifestyle   • Physical activity     Days per week: Not on file     Minutes per session: Not on file   • Stress: Not on file   Relationships   • Social connections     Talks on phone: Not on file     Gets together: Not on file     Attends [employer] service: Not on file     Active member of club or organization: Not on file     Attends meetings of clubs or organizations: Not on file     Relationship status: Not on file   • Intimate partner violence     Fear of current or ex partner: Not on file     Emotionally abused: Not on file     Physically abused: Not on file     Forced sexual activity: Not on file   Other Topics Concern   • Not on file   Social History Narrative   • Not on file       Medications  None       Current Facility-Administered Medications   Medication Dose Route Frequency Provider Last Rate Last Dose   • esmolol (BREVIBLOC) 2000 mg/100mL premix infusion  0-200 mcg/kg/min Intravenous Continuous Carter Rollins M.D. 62.6 mL/hr at 02/15/20 1232 200 mcg/kg/min at 02/15/20 1232   • niCARdipine (CARDENE) 25 mg in  mL Infusion  0-15 mg/hr Intravenous Continuous Roverto Adams M.D.         No current outpatient medications on file.       Allergies  No Known Allergies      Physical Exam  Pulse:  [] 70  Resp:  [14-20] 14  BP: (109-167)/() 167/106  SpO2:  [97 %-100 %] 98 %    Pulse/Extremity Exam:    Femorals:        Right:  palpable       Left palpable    Pedal Pulses:       Right DP palpable       Right PT palpable       Left DP palpable       Left PT palpable      General appearance: NAD, conversing appropriately  Psych: Normal affect, mood, judgement  Neuro: CN II-XII grossly intact.   Neck: full range of motion  Lungs: No inspiratory stridor or wheezing  CV: RRR  Abdomen: Soft, NT/ND  Skin: No rashes    Labs Reviewed Today:  Recent Labs     02/15/20  1156   WBC 8.8   RBC 4.50*   HEMOGLOBIN 13.7*   HEMATOCRIT 38.4*   MCV 85.3   MCH 30.4   MCHC 35.7*   RDW 41.4   PLATELETCT 167   MPV 10.6     Recent Labs     02/15/20  1156   SODIUM 140   POTASSIUM 2.5*   CHLORIDE 106   CO2 23   GLUCOSE 173*   BUN 14   CREATININE 1.35   CALCIUM 8.8     Recent Labs     02/15/20  1156   ALTSGPT 15   ASTSGOT 20   ALKPHOSPHAT 93   TBILIRUBIN 1.5   GLUCOSE 173*     Recent Labs     02/15/20  1156   APTT 22.1*   INR 1.18*             No results for input(s): TROPONINI in the last 72 hours.    Urinalysis:    No results found     Imaging Reviewed Today:  I personally reviewed all non-invasive vascular testing including images, x-rays, tracings, arterial waveforms, and duplex exams relevant to this admission. My interpretation is below:    CTA: I compared his CT today to that from Sept 2019. There is a chronic dissection thoracoabdmonial aneurysm significant increased in size. There is likely a contained rupture in the chest with aorta up to 8cm. Was previously 6.4cm. There is a small amount of free fluid in the left chest.      Assessment/Plan & Medical Decision-Making    Mr. Pressley presents with a contained rupture of his known thoracoabdominal aneurysm. He unfortunately left [facility] last fall when he was sent for urgent repair then. As we noted last fall, I do not have access the necessary devices to fix this endovascularly as he would need a 4 vessel fenestrated repair, nor do we do open TAA repair here.     Urgent transfer is being arranged by the ED.  I am available to speak with the accepting surgeon at any time.    Rovreto Adams MD  Vascular Surgeon  [facility]  Office: [phone]

## 2020-02-16 NOTE — ED NOTES
Report and care of pt to Shahla (paramedic) and Alexis (RN) with [facility]. Pt transferred onto [facility] and taken via [facility] out of ED to be transported to [place] in [place].

## 2020-02-16 NOTE — DISCHARGE PLANNING
note:  Faxed facesheet to William at [facility] per his request so he can set up ground transportation in [place].